# Patient Record
(demographics unavailable — no encounter records)

---

## 2025-03-19 NOTE — HISTORY OF PRESENT ILLNESS
[FreeTextEntry1] :  Subjective:   - Summary: Patient presents for follow-up of continuous treatment of thickened, elongated, painful, onychomycotic, onychogryphotic, discolored toenails times ten.. No change in medical history. Patient has completed chemotherapy treatments and is cancer-free.      - History of Present Illness (HPI): Darrin Urias presents for ongoing management of toenail issues characterized by thickening, elongation, pain, onychomycosis, onychogryphosis, and discoloration affecting all toenails. The condition appears to be chronic and requires continuous treatment.   - Past Medical History: History of cancer, now cancer-free after completing chemotherapy treatments   Objective:     - Vital Signs:    - Physical Examination (PE): DP +1/4 bilateral, PT +1/4 bilateral. Temperature gradient within normal levels. Capillary return is 2 seconds x 10. Toenails are thick, elongated, painful, onychomycotic, onychogryphotic, and discolored x 10.   Assessment:   - Summary: The patient presents with chronic onychomycosis and onychogryphosis affecting all toenails. The condition is characterized by thickening, elongation, pain, and discoloration. The patient's overall health has improved, having completed chemotherapy and being cancer-free.   - Problems:     - Onychomycosis     - Onychogryphosis        - Plan: -Exam.     - Perform mechanical debridement of painful, thick, elongated, onychomycotic, onychogryphotic toenails x 10     - Schedule follow-up appointment in 2 months

## 2025-05-21 NOTE — HISTORY OF PRESENT ILLNESS
[FreeTextEntry1] :  Subjective:   - Summary: Patient presents for follow-up care with complaints of thickening, elongated pain in toenails. Reports onychomycotic, onychogryphotic, dystrophic toenails affecting all ten toes. No changes in medication history or allergies.Compaint of generalized bilateral foot pain.   - Chief Complaint (CC): Thickening and elongated pain in toenails, and, bilateral generalized foot pain.   - History of Present Illness (HPI): Patient presents with onychomycotic, onychogryphotic, dystrophic toenails affecting all ten toes. The patient reports thickening and elongated pain in the toenails. The patient also reports pain in both left and right feet.     Objective:   - Diagnostic Results: Neurovascular status intact bilaterally   - Vital Signs: DP: @/4 B/L; PT: 1/4 B/L; TG: WNL B/L; CR: 1 second times ten.   - Physical Examination (PE): Skin is well hydrated with good turgor. Onychomycotic, onychogryphotic, painful, dystrophic toenails observed on all ten toes. Pain noted in left and right feet.    Assessment:    - Problems:     - Onychomycosis     - Onychogryphosis     - Bilateral foot pain                - Plan: -Exam.     - Performed aseptic mechanical debridement of thickened, elongated, painful, onychomycotic, onychogryphotic toenails times ten.     - Educate patient on proper foot hygiene and nail care     - Recommend appropriate footwear to minimize pressure on affected nails     - Schedule follow-up appointment in two months for reassessment

## 2025-07-23 NOTE — HISTORY OF PRESENT ILLNESS
[FreeTextEntry1] :  Subjective:   - Summary: Patient presents for follow-up care with complaints of thickening, elongated pain in toenails. Reports onychomycotic, onychogryphotic, dystrophic toenails affecting all ten toes. No changes in medication history or allergies. Complaint of generalized bilateral foot pain.   - Chief Complaint (CC): Thickening and elongated pain in toenails, and bilateral generalized foot pain.   - History of Present Illness (HPI): Patient presents with onychomycotic, onychogryphotic, dystrophic toenails affecting all ten toes. The patient reports thickening and elongated pain in the toenails. The patient also reports pain in both left and right feet.     Objective:   - Diagnostic Results: Neurovascular status intact bilaterally   - Vital Signs: DP: 1/4 B/L; PT: 1/4 B/L; TG: WNL B/L; CR: 1 second times ten.   - Physical Examination (PE): Skin is well hydrated with good turgor. Onychomycotic, onychogryphotic, painful, dystrophic toenails observed on all ten toes. Pain noted in left and right feet. Varicose veins present.   Assessment:    - Problems:     - Onychomycosis     - Onychogryphosis     - Bilateral foot pain       - Plan: -Exam.     - Performed aseptic mechanical debridement of thickened, elongated, painful, onychomycotic, onychogryphotic toenails times ten.     - Educate patient on proper foot hygiene and nail care     - Schedule follow-up appointment in two months for reassessment   Entered by Wisam Martínez, acting as a scribe for Dr. Nazario on 07/23/2025